# Patient Record
(demographics unavailable — no encounter records)

---

## 2025-03-03 NOTE — HISTORY OF PRESENT ILLNESS
[de-identified] : SHABANA CRAWFORD  is a 54 year  old man who presents to the [facility] for their snoring. Has congestion and feels like he can't talk properly. Has difficulty breathing through his nose when lying down. Occasionally uses flonase. Is currently recovering from recent URI. He is referred by self. No sleep study. He has not had prior DISE. Current alcohol usage: very rare Prior throat surgery: none Never smoked. Currently snores and has daytime fatigue. Has ~3 sinus infections a year. Denies dyspnea, otalgia, dysphagia, dysphonia, fevers/chills, and unintentional weight loss. Denies any other significant PMHx.

## 2025-03-03 NOTE — PROCEDURE
[de-identified] : NASAL ENDOSCOPY: Following administration of numbing and decongestant sprays, the nasal cavities were carefully inspected with a flexible endoscope. Their septum is somewhat serpiginous, but not obstructive. The anterior nasal mucosa is normal appearing bilaterally. The right side was inspected first. The inferior and middle turbinates are anatomic in their positioning. The middle meatus appears clear. The nasopharynx was clear and there was no mucopurulence noted. The bilateral eustachian tube orifices were patent and unobstructed. The mucosa throughout on the right appeared normal. The left side was inspected in similar fashion. The left nostril is partially blocked by protrusion of the septum. Again, the inferior and middle turbinates were anatomic in positioning. The middle meatus appeared free of lesions, obstruction or mucopurulence. The mucosa throughout the left nasal cavity appeared normal.   Stroboscopic Laryngoscopy Procedure Note: Indications: Assess laryngeal biomechanics and vocal fold oscillation. Description of Procedure: Informed consent was verbally obtained from the patient prior to the procedure. The patient was seated in the clinic chair. Topical anesthesia was achieved by first spraying the nasal cavities with 4% lidocaine and nasal decongestant. Findings: Supraglottis: no masses or lesions Glottis:  Vocal cords:                       Right:  paleness mid vocal fold                       Left:  paleness mid vocal fold                Mobility:                       Right:  normal                       Left:  normal                Amplitude:                       Right:  normal                       Left:  normal                Closure: complete                Wave symmetry:  symmetric Subglottis: no masses or lesions within the visualized subglottis. Visualized airway is widely patent.

## 2025-03-03 NOTE — PROCEDURE
[de-identified] : NASAL ENDOSCOPY: Following administration of numbing and decongestant sprays, the nasal cavities were carefully inspected with a flexible endoscope. Their septum is somewhat serpiginous, but not obstructive. The anterior nasal mucosa is normal appearing bilaterally. The right side was inspected first. The inferior and middle turbinates are anatomic in their positioning. The middle meatus appears clear. The nasopharynx was clear and there was no mucopurulence noted. The bilateral eustachian tube orifices were patent and unobstructed. The mucosa throughout on the right appeared normal. The left side was inspected in similar fashion. The left nostril is partially blocked by protrusion of the septum. Again, the inferior and middle turbinates were anatomic in positioning. The middle meatus appeared free of lesions, obstruction or mucopurulence. The mucosa throughout the left nasal cavity appeared normal.   Stroboscopic Laryngoscopy Procedure Note: Indications: Assess laryngeal biomechanics and vocal fold oscillation. Description of Procedure: Informed consent was verbally obtained from the patient prior to the procedure. The patient was seated in the clinic chair. Topical anesthesia was achieved by first spraying the nasal cavities with 4% lidocaine and nasal decongestant. Findings: Supraglottis: no masses or lesions Glottis:  Vocal cords:                       Right:  paleness mid vocal fold                       Left:  paleness mid vocal fold                Mobility:                       Right:  normal                       Left:  normal                Amplitude:                       Right:  normal                       Left:  normal                Closure: complete                Wave symmetry:  symmetric Subglottis: no masses or lesions within the visualized subglottis. Visualized airway is widely patent.

## 2025-03-03 NOTE — ASSESSMENT
[FreeTextEntry1] : Assessment/Plan: #1 Suspected STACY #2 Snoring #3 Left anterior septal deviation #4 Bilateral inferior turbinate hypertrophy   He was offered the followin) Consistent flonase use for 1 month 2) Referral to Dr. Perez for discussion of septoplasty and bilateral ITR 3) PSG 4) Observation  The risks, benefits, and alternatives to care were discussed with the patient and understanding expressed.  He elected to proceed forward with options #1, 2, and 3.  For scheduling [facility] [phone].  Should patient undergo surgery with Dr. Perez I would want him to get a PSG approx 1 month post op.  If he decides not to do surgery with her will get PSG next available.

## 2025-03-03 NOTE — HISTORY OF PRESENT ILLNESS
[de-identified] : SHABANA CRAWFORD  is a 54 year  old man who presents to the [facility] for their snoring. Has congestion and feels like he can't talk properly. Has difficulty breathing through his nose when lying down. Occasionally uses flonase. Is currently recovering from recent URI. He is referred by self. No sleep study. He has not had prior DISE. Current alcohol usage: very rare Prior throat surgery: none Never smoked. Currently snores and has daytime fatigue. Has ~3 sinus infections a year. Denies dyspnea, otalgia, dysphagia, dysphonia, fevers/chills, and unintentional weight loss. Denies any other significant PMHx.

## 2025-03-03 NOTE — ASSESSMENT
[FreeTextEntry1] : Assessment/Plan: #1 Suspected STACY #2 Snoring #3 Left anterior septal deviation #4 Bilateral inferior turbinate hypertrophy   He was offered the followin) Consistent flonase use for 1 month 2) Referral to Dr. Perez for discussion of septoplasty and bilateral ITR 3) PSG 4) Observation  The risks, benefits, and alternatives to care were discussed with the patient and understanding expressed.  He elected to proceed forward with options #1, 2, and 3.  For scheduling [facility] [phone].  Should patient undergo surgery with Dr. Perez I would want him to get a PSG approx 1 month post op.  If he decides not to do surgery with her will get PSG next available.

## 2025-03-24 NOTE — PHYSICAL EXAM
[General Appearance - Well Developed] : well developed [] : no respiratory distress [Normal Station and Gait] : the gait and station were normal for the patient's age [Oriented To Time, Place, And Person] : oriented to person, place, and time [Abdomen Soft] : soft [Abdomen Tenderness] : non-tender [de-identified] : deferred

## 2025-03-24 NOTE — PHYSICAL EXAM
[General Appearance - Well Developed] : well developed [] : no respiratory distress [Normal Station and Gait] : the gait and station were normal for the patient's age [Oriented To Time, Place, And Person] : oriented to person, place, and time [Abdomen Soft] : soft [Abdomen Tenderness] : non-tender [de-identified] : deferred
